# Patient Record
Sex: FEMALE | Race: OTHER | Employment: FULL TIME | URBAN - METROPOLITAN AREA
[De-identification: names, ages, dates, MRNs, and addresses within clinical notes are randomized per-mention and may not be internally consistent; named-entity substitution may affect disease eponyms.]

---

## 2018-01-29 ENCOUNTER — OFFICE VISIT (OUTPATIENT)
Dept: FAMILY MEDICINE CLINIC | Age: 23
End: 2018-01-29
Payer: COMMERCIAL

## 2018-01-29 VITALS
DIASTOLIC BLOOD PRESSURE: 84 MMHG | RESPIRATION RATE: 16 BRPM | SYSTOLIC BLOOD PRESSURE: 124 MMHG | BODY MASS INDEX: 46.17 KG/M2 | HEIGHT: 66 IN | WEIGHT: 287.31 LBS | TEMPERATURE: 97.2 F | HEART RATE: 88 BPM

## 2018-01-29 DIAGNOSIS — J02.0 STREP PHARYNGITIS: Primary | ICD-10-CM

## 2018-01-29 DIAGNOSIS — J02.9 SORE THROAT: ICD-10-CM

## 2018-01-29 DIAGNOSIS — J03.00 ACUTE NON-RECURRENT STREPTOCOCCAL TONSILLITIS: ICD-10-CM

## 2018-01-29 LAB — S PYO AG THROAT QL: POSITIVE

## 2018-01-29 PROCEDURE — 99213 OFFICE O/P EST LOW 20 MIN: CPT | Performed by: FAMILY MEDICINE

## 2018-01-29 PROCEDURE — G8417 CALC BMI ABV UP PARAM F/U: HCPCS | Performed by: FAMILY MEDICINE

## 2018-01-29 PROCEDURE — 1036F TOBACCO NON-USER: CPT | Performed by: FAMILY MEDICINE

## 2018-01-29 PROCEDURE — G8484 FLU IMMUNIZE NO ADMIN: HCPCS | Performed by: FAMILY MEDICINE

## 2018-01-29 PROCEDURE — 87880 STREP A ASSAY W/OPTIC: CPT | Performed by: FAMILY MEDICINE

## 2018-01-29 PROCEDURE — G8427 DOCREV CUR MEDS BY ELIG CLIN: HCPCS | Performed by: FAMILY MEDICINE

## 2018-01-29 RX ORDER — AMOXICILLIN AND CLAVULANATE POTASSIUM 875; 125 MG/1; MG/1
1 TABLET, FILM COATED ORAL 2 TIMES DAILY
Qty: 20 TABLET | Refills: 0 | Status: SHIPPED | OUTPATIENT
Start: 2018-01-29 | End: 2018-02-08

## 2018-01-29 ASSESSMENT — PATIENT HEALTH QUESTIONNAIRE - PHQ9
2. FEELING DOWN, DEPRESSED OR HOPELESS: 0
SUM OF ALL RESPONSES TO PHQ9 QUESTIONS 1 & 2: 0
1. LITTLE INTEREST OR PLEASURE IN DOING THINGS: 0
SUM OF ALL RESPONSES TO PHQ QUESTIONS 1-9: 0

## 2018-01-29 NOTE — PROGRESS NOTES
Subjective:      Patient ID: Conrad Sanchez is a 23 y.o. female. Chief Complaint   Patient presents with    Pharyngitis     x 3 days. Gradually worsening. Also has swollen glands. No other symptoms. Has tried cough drops and a hot toddy with no relief. 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 no other symptoms settled tiredness       rundown Tylenol helps somewhat      No Known Allergies  Outpatient Encounter Prescriptions as of 1/29/2018   Medication Sig Dispense Refill    amoxicillin-clavulanate (AUGMENTIN) 875-125 MG per tablet Take 1 tablet by mouth 2 times daily for 10 days 20 tablet 0    [DISCONTINUED] mometasone-formoterol (DULERA) 200-5 MCG/ACT inhaler Inhale 2 puffs into the lungs every 12 hours 1 Inhaler 3    [DISCONTINUED] albuterol sulfate HFA (PROAIR HFA) 108 (90 BASE) MCG/ACT inhaler Inhale 2 puffs into the lungs every 6 hours as needed for Wheezing 1 Inhaler 3     No facility-administered encounter medications on file as of 1/29/2018. Social History     Social History    Marital status: Single     Spouse name: N/A    Number of children: N/A    Years of education: N/A     Occupational History    Not on file. Social History Main Topics    Smoking status: Never Smoker    Smokeless tobacco: Never Used    Alcohol use Yes      Comment: occasionally    Drug use: Unknown    Sexual activity: Yes     Partners: Male     Other Topics Concern    Not on file     Social History Narrative    No narrative on file     Family History   Problem Relation Age of Onset    Crohn's Disease Brother     Asthma Brother     Other Mother      thyroid    Arthritis Maternal Grandmother      Past Medical History:   Diagnosis Date    Asthma      History reviewed. No pertinent surgical history.       REVIEW OF SYSTEMS:   REVIEW OF SYSTEMS:   Patient seen today for exam.  Denies any problems

## 2018-09-19 ENCOUNTER — OFFICE VISIT (OUTPATIENT)
Dept: FAMILY MEDICINE CLINIC | Age: 23
End: 2018-09-19
Payer: COMMERCIAL

## 2018-09-19 VITALS
DIASTOLIC BLOOD PRESSURE: 80 MMHG | OXYGEN SATURATION: 98 % | BODY MASS INDEX: 48.63 KG/M2 | TEMPERATURE: 97.9 F | HEIGHT: 65 IN | HEART RATE: 95 BPM | WEIGHT: 291.9 LBS | SYSTOLIC BLOOD PRESSURE: 130 MMHG | RESPIRATION RATE: 16 BRPM

## 2018-09-19 DIAGNOSIS — J34.89 SINUS PRESSURE: ICD-10-CM

## 2018-09-19 DIAGNOSIS — Z00.00 ANNUAL PHYSICAL EXAM: ICD-10-CM

## 2018-09-19 DIAGNOSIS — J01.11 ACUTE RECURRENT FRONTAL SINUSITIS: Primary | ICD-10-CM

## 2018-09-19 PROCEDURE — G8427 DOCREV CUR MEDS BY ELIG CLIN: HCPCS | Performed by: FAMILY MEDICINE

## 2018-09-19 PROCEDURE — 1036F TOBACCO NON-USER: CPT | Performed by: FAMILY MEDICINE

## 2018-09-19 PROCEDURE — G8417 CALC BMI ABV UP PARAM F/U: HCPCS | Performed by: FAMILY MEDICINE

## 2018-09-19 PROCEDURE — 99213 OFFICE O/P EST LOW 20 MIN: CPT | Performed by: FAMILY MEDICINE

## 2018-09-19 RX ORDER — CEFDINIR 300 MG/1
600 CAPSULE ORAL DAILY
Qty: 20 CAPSULE | Refills: 0 | Status: SHIPPED | OUTPATIENT
Start: 2018-09-19 | End: 2018-09-29

## 2018-09-19 NOTE — PROGRESS NOTES
(1.651 m)   Wt 291 lb 14.4 oz (132.4 kg)   LMP 09/13/2018 (Exact Date)   SpO2 98%   BMI 48.57 kg/m²     Physical Exam      PHYSICAL EXAMINATION:  Vital signs as noted. Alert, oriented in no acute distress. HEENT:  TMs are showing fluid bilaterally. Pharynx reveals postnasal drainage noted. Positive pain over sinuses and forehead  Pos shoddy adenopathy noted bilaterallyNECK: supple. HEART:  RRR without gallops. LUNGS:  clear to auscultation, no wheezing or rhonchi. ABDOMEN:  soft and nontender, bowel sounds positive. EXTREMITIES:  no edema. SKIN: without any changes      Assessment:       Diagnosis Orders   1. Acute recurrent frontal sinusitis     2. Annual physical exam  Comprehensive Metabolic Panel    Lipid Panel    CBC With Auto Differential   3. Sinus pressure               Plan:        Orders Placed This Encounter   Medications    cefdinir (OMNICEF) 300 MG capsule     Sig: Take 2 capsules by mouth daily for 10 days     Dispense:  20 capsule     Refill:  0     Orders Placed This Encounter   Procedures    Comprehensive Metabolic Panel     Standing Status:   Future     Standing Expiration Date:   9/19/2019    Lipid Panel     Standing Status:   Future     Standing Expiration Date:   9/19/2019     Order Specific Question:   Is Patient Fasting?/# of Hours     Answer:   yes    CBC With Auto Differential     Standing Status:   Future     Standing Expiration Date:   9/19/2019           Health Maintenance Due   Topic Date Due    HIV screen  04/02/2008    Pneumococcal med risk (1 of 1 - PPSV23) 04/02/2012    Flu vaccine (1) 09/01/2018             Controlled Substances Monitoring:     No flowsheet data found.         If anything worsens or changes please call us at once , follow-up in the office as planned,

## 2019-03-19 ENCOUNTER — TELEPHONE (OUTPATIENT)
Dept: FAMILY MEDICINE CLINIC | Age: 24
End: 2019-03-19

## 2023-11-08 PROBLEM — J45.909 ASTHMA (HHS-HCC): Status: ACTIVE | Noted: 2023-11-08

## 2023-11-08 RX ORDER — ASCORBIC ACID 125 MG
1 TABLET,CHEWABLE ORAL DAILY
COMMUNITY

## 2023-11-16 ENCOUNTER — OFFICE VISIT (OUTPATIENT)
Dept: PRIMARY CARE | Facility: CLINIC | Age: 28
End: 2023-11-16
Payer: COMMERCIAL

## 2023-11-16 VITALS
HEART RATE: 92 BPM | SYSTOLIC BLOOD PRESSURE: 133 MMHG | BODY MASS INDEX: 47.09 KG/M2 | HEIGHT: 66 IN | WEIGHT: 293 LBS | DIASTOLIC BLOOD PRESSURE: 84 MMHG | TEMPERATURE: 97.3 F

## 2023-11-16 DIAGNOSIS — Z12.4 PAP SMEAR FOR CERVICAL CANCER SCREENING: ICD-10-CM

## 2023-11-16 DIAGNOSIS — J45.20 MILD INTERMITTENT ASTHMA, UNSPECIFIED WHETHER COMPLICATED (HHS-HCC): ICD-10-CM

## 2023-11-16 DIAGNOSIS — E66.01 CLASS 3 SEVERE OBESITY DUE TO EXCESS CALORIES WITHOUT SERIOUS COMORBIDITY WITH BODY MASS INDEX (BMI) OF 45.0 TO 49.9 IN ADULT (MULTI): Primary | ICD-10-CM

## 2023-11-16 DIAGNOSIS — F32.1 CURRENT MODERATE EPISODE OF MAJOR DEPRESSIVE DISORDER, UNSPECIFIED WHETHER RECURRENT (MULTI): ICD-10-CM

## 2023-11-16 PROBLEM — M76.899 TENDONITIS OF KNEE: Status: ACTIVE | Noted: 2023-11-16

## 2023-11-16 PROCEDURE — 1036F TOBACCO NON-USER: CPT | Performed by: INTERNAL MEDICINE

## 2023-11-16 PROCEDURE — 99203 OFFICE O/P NEW LOW 30 MIN: CPT | Performed by: INTERNAL MEDICINE

## 2023-11-16 PROCEDURE — 3008F BODY MASS INDEX DOCD: CPT | Performed by: INTERNAL MEDICINE

## 2023-11-16 RX ORDER — BUPROPION HYDROCHLORIDE 150 MG/1
150 TABLET ORAL EVERY MORNING
Qty: 30 TABLET | Refills: 3 | Status: SHIPPED | OUTPATIENT
Start: 2023-11-16 | End: 2024-02-13 | Stop reason: SDUPTHER

## 2023-11-16 ASSESSMENT — ENCOUNTER SYMPTOMS
ARTHRALGIAS: 1
HOPELESSNESS: 1
FEELINGS OF WORTHLESSNESS: 1
CONFUSION: 0
NERVOUS/ANXIOUS: 0
CARDIOVASCULAR NEGATIVE: 1
EYES NEGATIVE: 1
NEUROLOGICAL NEGATIVE: 1
THOUGHTS THAT DEATH WOULD BE EASIER: 0
GASTROINTESTINAL NEGATIVE: 1
COMPULSIONS: 0
CHOKING: 0
ANHEDONIA: 1
DEPRESSION: 1
WEIGHT GAIN: 1
RESPIRATORY NEGATIVE: 1
DEPRESSED MOOD: 1
HYPERSOMNIA: 0
INSOMNIA: 1
SLEEP QUALITY: FAIR
HOURS OF SLEEP PER NIGHT: 7 HOURS
DECREASED CONCENTRATION: 0
SHORTNESS OF BREATH: 0
NIGHTTIME AWAKENINGS: ONE TO TWO
DYSPHORIC MOOD: 0

## 2023-11-16 NOTE — PROGRESS NOTES
"Subjective   Patient ID: Luli Canales is a 28 y.o. female who presents for \[facility] [place] [facility]\"" Care (Establishing care and depression).    Depression  Visit Type: initial  Onset of symptoms: more than 6 months ago  Progression since onset: waxing and waning  Patient presents with the following symptoms: anhedonia, depressed mood, feelings of hopelessness, feelings of worthlessness, insomnia and weight gain.  Patient is not experiencing: chest pain, choking sensation, compulsions, confusion, decreased concentration, dizziness, excessive worry, fatigue, hypersomnia, nausea, nervousness/anxiety, shortness of breath and thoughts of death.  Frequency of symptoms: most days   Severity: moderate   Sleep per night: 7 hours  Sleep quality: fair  Nighttime awakenings: one to two  No history of: anxiety/panic attacks and mental illness       Review of Systems   Constitutional:  Positive for weight gain.   HENT: Negative.     Eyes: Negative.    Respiratory: Negative.  Negative for choking and shortness of breath.    Cardiovascular: Negative.    Gastrointestinal: Negative.    Musculoskeletal:  Positive for arthralgias.   Neurological: Negative.    Psychiatric/Behavioral:  Positive for depression. Negative for confusion, decreased concentration and dysphoric mood. The patient has insomnia. The patient is not nervous/anxious.        Objective   /84 (BP Location: Left arm, Patient Position: Sitting, BP Cuff Size: Large adult)   Pulse 92   Temp 36.3 °C (97.3 °F) (Temporal)   Ht 1.676 m (5' 6\")   Wt 137 kg (303 lb)   BMI 48.91 kg/m²     Physical Exam  Constitutional:       Appearance: Normal appearance. She is obese.   HENT:      Head: Normocephalic.   Eyes:      Conjunctiva/sclera: Conjunctivae normal.   Cardiovascular:      Rate and Rhythm: Normal rate and regular rhythm.      Heart sounds: Normal heart sounds.   Pulmonary:      Effort: Pulmonary effort is normal.      Breath sounds: Normal breath sounds.   Abdominal:      " General: Abdomen is protuberant.      Palpations: Abdomen is soft.   Musculoskeletal:         General: Normal range of motion.      Cervical back: Normal range of motion and neck supple.   Skin:     General: Skin is warm and dry.   Neurological:      General: No focal deficit present.      Mental Status: She is oriented to person, place, and time. Mental status is at baseline.   Psychiatric:         Mood and Affect: Mood is depressed. Mood is not anxious. Affect is not blunt, angry or tearful.       Assessment/Plan   Problem List Items Addressed This Visit             ICD-10-CM    Asthma J45.909     Other Visit Diagnoses         Codes    Class 3 severe obesity due to excess calories without serious comorbidity with body mass index (BMI) of 45.0 to 49.9 in adult (CMS/[facility])    -  Primary E66.01, Z68.42    Current moderate episode of major depressive disorder, unspecified whether recurrent (CMS/HCC)     F32.1        Depression is chronic and quite common and notorious mental health disorder and it is quite common and widespread, there are several therapeutics available for depression now a days. It is considered as chemical imbalance disorder and with medications , it can be adjusted. There are side effects from SSRI and SNRIs but on long term, they are well tolerated. Please do not feel awkward or shy to contact us if feel tired, sleepy, lack of energy or aloof/ alone. Untreated depression can bring serious consequences including suicidal ideations, mental health counselling is available if need arises. Usually treatment of depression is long lasting therapy with periodic evaluations and follow ups. Pt with depression no longer have to feel frustrated, helpless or isolated.Detailed discussion was carried out.   Reviewed depression questionnaire and she scored 12, she has a moderate to severe depression, needed counseling to psychotherapy, needed therapy bupropion will be the appropriate treatment because of weight  neutral properties.  SSRI and SNRI has weight gain properties, we need to work with this patient consistently to get her out of this depression and enjoyment of the life, pleasure of the life, meeting people friend's family having some outdoor activities particularly when weather is better or even in the festive times are always important part for mental wellbeing exercise also is a important part of wellbeing, periodic assessment and follow-up will be done, several laboratories were ordered, GYN checkup is must, asthma is not a acute issue, obesity is a issue and that also could be the culprit for her depression not intensely although, she works full-time, she works from home, would discuss with her mother periodically who works in our office, lets make sure that therapeutic options are appropriate for her to come out of this depression with the help of counseling.

## 2023-11-27 ENCOUNTER — LAB (OUTPATIENT)
Dept: LAB | Facility: LAB | Age: 28
End: 2023-11-27
Payer: COMMERCIAL

## 2023-11-27 DIAGNOSIS — E66.01 CLASS 3 SEVERE OBESITY DUE TO EXCESS CALORIES WITHOUT SERIOUS COMORBIDITY WITH BODY MASS INDEX (BMI) OF 45.0 TO 49.9 IN ADULT (MULTI): ICD-10-CM

## 2023-11-27 DIAGNOSIS — F32.1 CURRENT MODERATE EPISODE OF MAJOR DEPRESSIVE DISORDER, UNSPECIFIED WHETHER RECURRENT (MULTI): ICD-10-CM

## 2023-11-27 LAB
25(OH)D3 SERPL-MCNC: 23 NG/ML (ref 30–100)
ALBUMIN SERPL BCP-MCNC: 4 G/DL (ref 3.4–5)
ALP SERPL-CCNC: 78 U/L (ref 33–110)
ALT SERPL W P-5'-P-CCNC: 11 U/L (ref 7–45)
ANION GAP SERPL CALC-SCNC: 15 MMOL/L (ref 10–20)
AST SERPL W P-5'-P-CCNC: 14 U/L (ref 9–39)
BASOPHILS # BLD AUTO: 0.03 X10*3/UL (ref 0–0.1)
BASOPHILS NFR BLD AUTO: 0.3 %
BILIRUB SERPL-MCNC: 0.3 MG/DL (ref 0–1.2)
BUN SERPL-MCNC: 11 MG/DL (ref 6–23)
CALCIUM SERPL-MCNC: 8.9 MG/DL (ref 8.6–10.3)
CHLORIDE SERPL-SCNC: 105 MMOL/L (ref 98–107)
CHOLEST SERPL-MCNC: 202 MG/DL (ref 0–199)
CHOLESTEROL/HDL RATIO: 3.7
CO2 SERPL-SCNC: 24 MMOL/L (ref 21–32)
CREAT SERPL-MCNC: 0.81 MG/DL (ref 0.5–1.05)
EOSINOPHIL # BLD AUTO: 0.12 X10*3/UL (ref 0–0.7)
EOSINOPHIL NFR BLD AUTO: 1.2 %
ERYTHROCYTE [DISTWIDTH] IN BLOOD BY AUTOMATED COUNT: 14.4 % (ref 11.5–14.5)
GFR SERPL CREATININE-BSD FRML MDRD: >90 ML/MIN/1.73M*2
GLUCOSE SERPL-MCNC: 83 MG/DL (ref 74–99)
HCT VFR BLD AUTO: 38.9 % (ref 36–46)
HDLC SERPL-MCNC: 54.5 MG/DL
HGB BLD-MCNC: 12 G/DL (ref 12–16)
IMM GRANULOCYTES # BLD AUTO: 0.02 X10*3/UL (ref 0–0.7)
IMM GRANULOCYTES NFR BLD AUTO: 0.2 % (ref 0–0.9)
LDLC SERPL CALC-MCNC: 125 MG/DL
LYMPHOCYTES # BLD AUTO: 3.22 X10*3/UL (ref 1.2–4.8)
LYMPHOCYTES NFR BLD AUTO: 33.3 %
MCH RBC QN AUTO: 25.6 PG (ref 26–34)
MCHC RBC AUTO-ENTMCNC: 30.8 G/DL (ref 32–36)
MCV RBC AUTO: 83 FL (ref 80–100)
MONOCYTES # BLD AUTO: 0.75 X10*3/UL (ref 0.1–1)
MONOCYTES NFR BLD AUTO: 7.8 %
NEUTROPHILS # BLD AUTO: 5.52 X10*3/UL (ref 1.2–7.7)
NEUTROPHILS NFR BLD AUTO: 57.2 %
NON HDL CHOLESTEROL: 148 MG/DL (ref 0–149)
NRBC BLD-RTO: 0 /100 WBCS (ref 0–0)
PLATELET # BLD AUTO: 270 X10*3/UL (ref 150–450)
POTASSIUM SERPL-SCNC: 4 MMOL/L (ref 3.5–5.3)
PROT SERPL-MCNC: 6.8 G/DL (ref 6.4–8.2)
RBC # BLD AUTO: 4.69 X10*6/UL (ref 4–5.2)
SODIUM SERPL-SCNC: 140 MMOL/L (ref 136–145)
TRIGL SERPL-MCNC: 113 MG/DL (ref 0–149)
TSH SERPL-ACNC: 2.69 MIU/L (ref 0.44–3.98)
VLDL: 23 MG/DL (ref 0–40)
WBC # BLD AUTO: 9.7 X10*3/UL (ref 4.4–11.3)

## 2023-11-27 PROCEDURE — 82306 VITAMIN D 25 HYDROXY: CPT

## 2023-11-27 PROCEDURE — 80061 LIPID PANEL: CPT

## 2023-11-27 PROCEDURE — 36415 COLL VENOUS BLD VENIPUNCTURE: CPT

## 2023-11-27 PROCEDURE — 84443 ASSAY THYROID STIM HORMONE: CPT

## 2023-11-27 PROCEDURE — 85025 COMPLETE CBC W/AUTO DIFF WBC: CPT

## 2023-11-27 PROCEDURE — 80053 COMPREHEN METABOLIC PANEL: CPT

## 2024-02-01 ENCOUNTER — APPOINTMENT (OUTPATIENT)
Dept: PRIMARY CARE | Facility: CLINIC | Age: 29
End: 2024-02-01
Payer: COMMERCIAL

## 2024-02-13 DIAGNOSIS — F32.1 CURRENT MODERATE EPISODE OF MAJOR DEPRESSIVE DISORDER, UNSPECIFIED WHETHER RECURRENT (MULTI): ICD-10-CM

## 2024-02-13 RX ORDER — BUPROPION HYDROCHLORIDE 150 MG/1
150 TABLET ORAL EVERY MORNING
Qty: 30 TABLET | Refills: 3 | Status: SHIPPED | OUTPATIENT
Start: 2024-02-13 | End: 2024-05-24 | Stop reason: SDUPTHER

## 2024-05-24 ENCOUNTER — APPOINTMENT (OUTPATIENT)
Dept: PRIMARY CARE | Facility: CLINIC | Age: 29
End: 2024-05-24
Payer: COMMERCIAL

## 2024-05-24 ENCOUNTER — OFFICE VISIT (OUTPATIENT)
Dept: PRIMARY CARE | Facility: CLINIC | Age: 29
End: 2024-05-24
Payer: COMMERCIAL

## 2024-05-24 VITALS
SYSTOLIC BLOOD PRESSURE: 130 MMHG | BODY MASS INDEX: 48.82 KG/M2 | DIASTOLIC BLOOD PRESSURE: 79 MMHG | HEART RATE: 75 BPM | HEIGHT: 65 IN | TEMPERATURE: 97.1 F | WEIGHT: 293 LBS

## 2024-05-24 DIAGNOSIS — Z01.419 WELL WOMAN EXAM: ICD-10-CM

## 2024-05-24 DIAGNOSIS — J45.20 MILD INTERMITTENT ASTHMA, UNSPECIFIED WHETHER COMPLICATED (HHS-HCC): ICD-10-CM

## 2024-05-24 DIAGNOSIS — F32.1 CURRENT MODERATE EPISODE OF MAJOR DEPRESSIVE DISORDER, UNSPECIFIED WHETHER RECURRENT (MULTI): ICD-10-CM

## 2024-05-24 DIAGNOSIS — E66.01 CLASS 3 SEVERE OBESITY DUE TO EXCESS CALORIES WITHOUT SERIOUS COMORBIDITY WITH BODY MASS INDEX (BMI) OF 50.0 TO 59.9 IN ADULT (MULTI): Primary | ICD-10-CM

## 2024-05-24 PROCEDURE — 99213 OFFICE O/P EST LOW 20 MIN: CPT | Performed by: INTERNAL MEDICINE

## 2024-05-24 PROCEDURE — 1036F TOBACCO NON-USER: CPT | Performed by: INTERNAL MEDICINE

## 2024-05-24 PROCEDURE — 3008F BODY MASS INDEX DOCD: CPT | Performed by: INTERNAL MEDICINE

## 2024-05-24 RX ORDER — BUPROPION HYDROCHLORIDE 150 MG/1
150 TABLET ORAL EVERY MORNING
Qty: 30 TABLET | Refills: 3 | Status: SHIPPED | OUTPATIENT
Start: 2024-05-24 | End: 2024-09-21

## 2024-05-24 ASSESSMENT — ENCOUNTER SYMPTOMS
ARTHRALGIAS: 0
DECREASED CONCENTRATION: 0
NEUROLOGICAL NEGATIVE: 1
CARDIOVASCULAR NEGATIVE: 1
GASTROINTESTINAL NEGATIVE: 1
INSOMNIA: 0
SLEEP QUALITY: FAIR
CONSTITUTIONAL NEGATIVE: 1
HEMOPTYSIS: 0
CHEST TIGHTNESS: 0
NERVOUS/ANXIOUS: 0
RESPIRATORY NEGATIVE: 1
DEPRESSION: 1
SLEEP DISTURBANCE: 0
SHORTNESS OF BREATH: 0
AGITATION: 0

## 2024-05-24 NOTE — PROGRESS NOTES
"Subjective   Patient ID: Luli Canales is a 29 y.o. female who presents for Follow-up (6 mo fu).    Depression  Visit Type: follow-up  Patient is not experiencing: chest pain, decreased concentration, fatigue, insomnia, nausea, nervousness/anxiety and shortness of breath.   Severity: mild   Sleep quality: fair  Patient has a history of: asthma    Asthma  There is no chest tightness, hemoptysis or shortness of breath. The current episode started more than 1 year ago. The problem occurs rarely. The problem has been resolved. Her symptoms are aggravated by nothing. Her past medical history is significant for asthma.        Review of Systems   Constitutional: Negative.         Wt gain   Respiratory: Negative.  Negative for hemoptysis and shortness of breath.    Cardiovascular: Negative.    Gastrointestinal: Negative.    Musculoskeletal:  Negative for arthralgias.   Skin: Negative.    Neurological: Negative.    Psychiatric/Behavioral:  Positive for depression. Negative for agitation, decreased concentration and sleep disturbance. The patient is not nervous/anxious and does not have insomnia.        Objective   /79 (BP Location: Right arm, Patient Position: Sitting, BP Cuff Size: Large adult)   Pulse 75   Temp 36.2 °C (97.1 °F) (Temporal)   Ht 1.651 m (5' 5\")   Wt 139 kg (306 lb)   BMI 50.92 kg/m²     Physical Exam  Vitals reviewed.   Constitutional:       Appearance: Normal appearance. She is obese.   HENT:      Head: Normocephalic and atraumatic.   Eyes:      Conjunctiva/sclera: Conjunctivae normal.   Cardiovascular:      Rate and Rhythm: Normal rate and regular rhythm.      Pulses: Normal pulses.   Pulmonary:      Effort: Pulmonary effort is normal.      Breath sounds: Normal breath sounds.   Abdominal:      General: Abdomen is protuberant.      Palpations: Abdomen is soft.   Musculoskeletal:         General: Normal range of motion.      Cervical back: Neck supple.   Skin:     General: Skin is warm and dry. "   Neurological:      General: No focal deficit present.   Psychiatric:         Mood and Affect: Mood normal.       Assessment/Plan   Problem List Items Addressed This Visit             ICD-10-CM    Asthma ([facility]) J45.909     Other Visit Diagnoses         Codes    Class 3 severe obesity due to excess calories without serious comorbidity with body mass index (BMI) of 50.0 to 59.9 in adult (Multi)    -  Primary E66.01, Z68.43    Current moderate episode of major depressive disorder, unspecified whether recurrent (Multi)     F32.1    Relevant Medications    buPROPion XL (Wellbutrin XL) 150 mg 24 hr tablet    Well woman exam     Z01.419    Relevant Orders    Referral to Gynecology        I spent <15 minutes face to face with individual providing recommendations for nutrition choices and exercise plan to help achieve weight reduction goals. Obesity is systemic disorder and it can bring devastating morbidities in furture. It is a matter of calorie gain and loss, keeping bodybank in negative calorie balance mode is the way to sustain weight loss.Diet has a big role in reducing excess body wt. Scheduled and well planned meals and food intake with watchfulness and understanding of calorie portion and distribution is key to understand. Bariatric surgery is another option if sustained wt loss is not achieved and faced with one or more comorbidities with morbid obesity. Weigh yourself twice a month to understand and follow wt loss goals. Need wt loss of  12 lbs by labor day. Need GYN check. Be motivated.  Concerned about obesity although BMI of more than 50, weight loss target was given, has not have any asthma does not have to use any inhalers it is a mild very intermittent asthma I would say, she will continue Wellbutrin and with Wellbutrin she has felt much better, in the future could consider something like phentermine if she has a difficulty losing weight but she will try and with her diet modification and lifestyle  modification there is always possibility of weight loss, various measures were discussed, profound visceral obesity and hip and [facility] obesity is there.  Last times laboratories were reviewed, follow-up in 3 months.

## 2024-06-12 DIAGNOSIS — F32.1 CURRENT MODERATE EPISODE OF MAJOR DEPRESSIVE DISORDER, UNSPECIFIED WHETHER RECURRENT (MULTI): ICD-10-CM

## 2024-06-12 RX ORDER — BUPROPION HYDROCHLORIDE 150 MG/1
150 TABLET ORAL EVERY MORNING
Qty: 30 TABLET | Refills: 3 | Status: SHIPPED | OUTPATIENT
Start: 2024-06-12 | End: 2024-06-13 | Stop reason: SDUPTHER

## 2024-06-13 DIAGNOSIS — F32.1 CURRENT MODERATE EPISODE OF MAJOR DEPRESSIVE DISORDER, UNSPECIFIED WHETHER RECURRENT (MULTI): ICD-10-CM

## 2024-06-13 RX ORDER — BUPROPION HYDROCHLORIDE 150 MG/1
150 TABLET ORAL EVERY MORNING
Qty: 30 TABLET | Refills: 3 | Status: SHIPPED | OUTPATIENT
Start: 2024-06-13 | End: 2024-10-11

## 2024-08-23 ENCOUNTER — APPOINTMENT (OUTPATIENT)
Dept: PRIMARY CARE | Facility: CLINIC | Age: 29
End: 2024-08-23
Payer: COMMERCIAL

## 2024-08-23 VITALS
SYSTOLIC BLOOD PRESSURE: 123 MMHG | WEIGHT: 293 LBS | HEART RATE: 80 BPM | BODY MASS INDEX: 48.82 KG/M2 | TEMPERATURE: 96 F | DIASTOLIC BLOOD PRESSURE: 82 MMHG | HEIGHT: 65 IN

## 2024-08-23 DIAGNOSIS — F32.9 REACTIVE DEPRESSION: ICD-10-CM

## 2024-08-23 DIAGNOSIS — E66.01 CLASS 3 SEVERE OBESITY DUE TO EXCESS CALORIES WITHOUT SERIOUS COMORBIDITY WITH BODY MASS INDEX (BMI) OF 50.0 TO 59.9 IN ADULT (MULTI): Primary | ICD-10-CM

## 2024-08-23 PROBLEM — E66.813 CLASS 3 SEVERE OBESITY DUE TO EXCESS CALORIES WITHOUT SERIOUS COMORBIDITY WITH BODY MASS INDEX (BMI) OF 50.0 TO 59.9 IN ADULT: Status: ACTIVE | Noted: 2024-08-23

## 2024-08-23 PROCEDURE — 1036F TOBACCO NON-USER: CPT | Performed by: INTERNAL MEDICINE

## 2024-08-23 PROCEDURE — 99213 OFFICE O/P EST LOW 20 MIN: CPT | Performed by: INTERNAL MEDICINE

## 2024-08-23 PROCEDURE — 3008F BODY MASS INDEX DOCD: CPT | Performed by: INTERNAL MEDICINE

## 2024-08-23 ASSESSMENT — ENCOUNTER SYMPTOMS
GASTROINTESTINAL NEGATIVE: 1
MUSCULOSKELETAL NEGATIVE: 1
RESPIRATORY NEGATIVE: 1
HEMATOLOGIC/LYMPHATIC NEGATIVE: 1
CARDIOVASCULAR NEGATIVE: 1
CONSTITUTIONAL NEGATIVE: 1
NEUROLOGICAL NEGATIVE: 1

## 2024-08-23 NOTE — PROGRESS NOTES
Subjective   Patient ID: Luli Canales is a 29 y.o. female who presents for Follow-up (3 mo fu).  HPI  Depression  Visit Type: follow-up  Patient is not experiencing: chest pain, decreased concentration, fatigue, insomnia, nausea, nervousness/anxiety and shortness of breath.   Severity: mild   Sleep quality: fair  Patient has a history of: asthma  Obesity  Luli Canales is a 29 y.o. female who presents for evaluation of obesity. She has noted a weight gain of approximately few pounds over the last several weeks. There is a positive family history for obesity in  family . She feels ideal weight is 200 pounds. Weight at graduation from high school was 202 pounds. History of eating disorders: none. Previous treatments for obesity include: none. Associated medical conditions:  none . Associated medications:  none . Cardiovascular risk factors besides obesity: obesity (BMI >= 30 kg/m2) and sedentary lifestyle.    The following portions of the chart were reviewed this encounter and updated as appropriate:  Tobacco  Allergies  Meds  Problems  Med Hx  Surg Hx  Fam Hx       Past Medical History  History reviewed. No pertinent past medical history.    Social History  Social History     Tobacco Use    Smoking status: Never    Smokeless tobacco: Never   Vaping Use    Vaping status: Never Used   Substance Use Topics    Alcohol use: Not Currently    Drug use: Never       Family History     Family History   Problem Relation Name Age of Onset    Hypothyroidism Mother Vane     Hypertension Mother Vane     Depression Mother Vane     No Known Problems Father      ADD / ADHD Brother D'Wain     Crohn's disease Brother D'Wain     Diabetes Mother's Brother D'Wain     COPD Mother's Brother D'Wain     Pneumonia Mother's Brother D'Wain     No Known Problems Father's Brother      Diabetes Maternal Grandmother Carmen     Heart failure Maternal Grandmother Carmen     COPD Maternal Grandmother Carmen     Heart attack Maternal Grandmother  "Carmen     Stroke Maternal Grandmother Carmne     Neuropathy Maternal Grandmother Carmen     Hyperthyroidism Maternal Grandmother Carmen     Hypertension Maternal Grandmother Carmen     Peripheral vascular disease Maternal Grandfather Ajay     COPD Maternal Grandfather Ajay     Lung cancer Maternal Grandfather Ajay     No Known Problems Paternal Grandmother      No Known Problems Paternal Grandfather         Allergies:  No Known Allergies     Outpatient Medications:  Current Outpatient Medications   Medication Instructions    biotin 5,000 mcg tablet,chewable 1 tablet, oral, Daily    buPROPion XL (WELLBUTRIN XL) 150 mg, oral, Every morning, Do not crush, chew, or split.    multivit-min/folic acid/uwd331 (ALIVE PREMIUM ADULT ORAL) 1 tablet, oral, Daily        Review of Systems   Constitutional: Negative.    Respiratory: Negative.     Cardiovascular: Negative.    Gastrointestinal: Negative.    Musculoskeletal: Negative.    Neurological: Negative.    Hematological: Negative.          Objective       Physical Exam  Vitals reviewed.   Constitutional:       Appearance: Normal appearance. She is obese.   HENT:      Head: Normocephalic.   Eyes:      Conjunctiva/sclera: Conjunctivae normal.   Cardiovascular:      Rate and Rhythm: Normal rate and regular rhythm.      Pulses: Normal pulses.   Pulmonary:      Effort: Pulmonary effort is normal.      Breath sounds: Normal breath sounds.   Abdominal:      General: Abdomen is protuberant.      Palpations: Abdomen is soft.   Musculoskeletal:         General: No tenderness or deformity.      Cervical back: Neck supple.   Skin:     General: Skin is warm and dry.   Neurological:      General: No focal deficit present.   Psychiatric:         Mood and Affect: Mood normal.     /82 (BP Location: Right arm, Patient Position: Sitting, BP Cuff Size: Large adult)   Pulse 80   Temp 35.6 °C (96 °F) (Temporal)   Ht 1.651 m (5' 5\")   Wt 139 kg (306 lb)   BMI 50.92 kg/m²  "     Assessment/Plan   Problem List Items Addressed This Visit       Class 3 severe obesity due to excess calories without serious comorbidity with body mass index (BMI) of 50.0 to 59.9 in adult (Multi) - Primary    Reactive depression   I spent <15 minutes face to face with individual providing recommendations for nutrition choices and exercise plan to help achieve weight reduction goals. Obesity is systemic disorder and it can bring devastating morbidities in furture. It is a matter of calorie gain and loss, keeping bodybank in negative calorie balance mode is the way to sustain weight loss.Diet has a big role in reducing excess body wt. Scheduled and well planned meals and food intake with watchfulness and understanding of calorie portion and distribution is key to understand. Bariatric surgery is another option if sustained wt loss is not achieved and faced with one or more comorbidities with morbid obesity. Weigh yourself twice a week to understand and follow wt loss goals.  Gave a target of weight loss that need to be at 280 pounds in 6 months, depression is controlled, talked about various modalities for weight loss there are some commercial diets, weight watchers, intermittent fasting, any modality which shows to be a looking to have a restricted calories, healthy choices of meals, she works from home, this much of obesity could bring a lot of comorbidities in the future, Wellbutrin dose will not be altered, follow-up in 6 months.  GYN checkup should be done.

## 2024-09-13 PROBLEM — Z01.419 NORMAL GYNECOLOGIC EXAMINATION: Status: ACTIVE | Noted: 2024-09-13

## 2024-09-16 ENCOUNTER — APPOINTMENT (OUTPATIENT)
Dept: OBSTETRICS AND GYNECOLOGY | Facility: CLINIC | Age: 29
End: 2024-09-16
Payer: COMMERCIAL

## 2024-09-16 VITALS
DIASTOLIC BLOOD PRESSURE: 88 MMHG | HEIGHT: 65 IN | BODY MASS INDEX: 48.82 KG/M2 | WEIGHT: 293 LBS | SYSTOLIC BLOOD PRESSURE: 146 MMHG

## 2024-09-16 DIAGNOSIS — Z12.4 SCREENING FOR CERVICAL CANCER: ICD-10-CM

## 2024-09-16 DIAGNOSIS — Z30.011 ORAL CONTRACEPTION INITIAL PRESCRIPTION: ICD-10-CM

## 2024-09-16 DIAGNOSIS — Z30.41 ORAL CONTRACEPTIVE USE: ICD-10-CM

## 2024-09-16 DIAGNOSIS — Z01.419 NORMAL GYNECOLOGIC EXAMINATION: Primary | ICD-10-CM

## 2024-09-16 LAB — PREGNANCY TEST URINE, POC: NEGATIVE

## 2024-09-16 PROCEDURE — 3008F BODY MASS INDEX DOCD: CPT | Performed by: OBSTETRICS & GYNECOLOGY

## 2024-09-16 PROCEDURE — 81025 URINE PREGNANCY TEST: CPT | Performed by: OBSTETRICS & GYNECOLOGY

## 2024-09-16 PROCEDURE — 1036F TOBACCO NON-USER: CPT | Performed by: OBSTETRICS & GYNECOLOGY

## 2024-09-16 PROCEDURE — 87624 HPV HI-RISK TYP POOLED RSLT: CPT

## 2024-09-16 PROCEDURE — 99385 PREV VISIT NEW AGE 18-39: CPT | Performed by: OBSTETRICS & GYNECOLOGY

## 2024-09-16 RX ORDER — VIT C/E/ZN/COPPR/LUTEIN/ZEAXAN 250MG-90MG
25 CAPSULE ORAL DAILY
COMMUNITY

## 2024-09-16 NOTE — PROGRESS NOTES
"GYNECOLOGY PROGRESS NOTE        CC:    Chief Complaint   Patient presents with    Annual Exam     New patient - annual exam with pap test and discuss BC - interested in the pill  Last pap - 12 yrs ago  Chaperone kristie albert ma       HPI:  Luli Canales is here for a routine GYN examination.  No GYN c/o, no AUB.  Current BC=condoms, wants to restart OCPs, no pregnancy plans.      Depression screen:  negative  Fall screen:  negative  Domestic violence screen:  negative      ROS:  GI - no blood in BMs  URO - no hematuria  GYN - no AUB or vaginal discharge  PSYCH - on Rx--stable        PHYSICAL EXAM:  /88 (BP Location: Left arm, Patient Position: Sitting)   Ht 1.651 m (5' 5\")   Wt 139 kg (306 lb 8 oz)   LMP 08/28/2024   BMI 51.00 kg/m²   GEN:  A&O, NAD  ABD:  NT/ND, soft, no palpable masses, morbidly obese  URO:  normal urethra, no bladder TTP  GYN:  normal vulva and perineum w/o lesions or ulcers, normal vagina without discharge or lesions, normal cervix without lesions or discharge or CMT, bimanual exam--extremely limited due to body habitus but no gross masses or TTP   BREAST:  pendulous, no masses or TTP, no skin lesions or nipple discharge  PSYCH:  normal affect, non-anxious        IMPRESSION/PLAN:  Problem List Items Addressed This Visit       Normal gynecologic examination - Primary     Other Visit Diagnoses       Screening for cervical cancer        Relevant Orders    THINPREP PAP TEST    Oral contraception initial prescription        Relevant Orders    POCT pregnancy, urine manually resulted    Oral contraceptive use        Relevant Medications    norethindrone-e.estradioL-iron (Lo Loestrin) 1 mg-10 mcg (24)/10 mcg (2) tablet               Pap/HPV done today, no Hx of HGSIL, next pap/HPV due in 3 years (2027) due to lack of recent adequate cervical CA screening (last > 10 years).  ASCCP pap smear screening guidelines reviewed with the patient.   Gardasil:  yes    Birth control counseling:  Counseling " done on all birth control options, use/AE of hormonal birth control reviewed.  Recommend LARCs or Depo-Provera as first-line birth control, discouraged OCPs or barrier-only methods of birth control.  Will proceed with OCPs for contraception for now per patient request, use/AE reviewed.  Long-term birth control plan of a LARC encouraged.      OCP Rx:  Rx for Lo Loestrin FE provided--best to use lowest estrogen dose OCP given BMI > 50, use/AE reviewed, no absolute contraindications to use until age 35, VTE precautions reviewed.  Condom use PRN for STD prevention and backup birth control.            Ryan Garcia, DO          Ryan Garcia, DO

## 2024-09-30 LAB
CYTOLOGY CMNT CVX/VAG CYTO-IMP: NORMAL
HPV HR 12 DNA GENITAL QL NAA+PROBE: POSITIVE
HPV HR GENOTYPES PNL CVX NAA+PROBE: POSITIVE
HPV16 DNA SPEC QL NAA+PROBE: NEGATIVE
HPV18 DNA SPEC QL NAA+PROBE: NEGATIVE
LAB AP HPV GENOTYPE QUESTION: YES
LAB AP HPV HR: NORMAL
LABORATORY COMMENT REPORT: NORMAL
LMP START DATE: NORMAL
PATH REPORT.TOTAL CANCER: NORMAL

## 2024-10-02 ENCOUNTER — TELEPHONE (OUTPATIENT)
Dept: OBSTETRICS AND GYNECOLOGY | Facility: CLINIC | Age: 29
End: 2024-10-02
Payer: COMMERCIAL

## 2024-10-02 NOTE — TELEPHONE ENCOUNTER
Spoke with patient and made her aware of the results and recommendation.  Reviewed and approved by DANITZA SANTOS on 10/2/24 at 9:45 AM.

## 2024-10-02 NOTE — TELEPHONE ENCOUNTER
----- Message from Ryan Garcia sent at 9/30/2024  5:46 PM EDT -----  Call patient and let her know that her pap results were normal pap with positive other high-risk HPV. Guidelines call to just repeat the Pap and HPV testing in one year, if the HPV is still positive in a year then she will need a colposcopy.

## 2024-10-08 DIAGNOSIS — F32.1 CURRENT MODERATE EPISODE OF MAJOR DEPRESSIVE DISORDER, UNSPECIFIED WHETHER RECURRENT (MULTI): ICD-10-CM

## 2024-10-08 RX ORDER — BUPROPION HYDROCHLORIDE 150 MG/1
150 TABLET ORAL EVERY MORNING
Qty: 30 TABLET | Refills: 3 | Status: SHIPPED | OUTPATIENT
Start: 2024-10-08 | End: 2025-02-05

## 2025-02-03 DIAGNOSIS — F32.1 CURRENT MODERATE EPISODE OF MAJOR DEPRESSIVE DISORDER, UNSPECIFIED WHETHER RECURRENT (MULTI): ICD-10-CM

## 2025-02-03 RX ORDER — BUPROPION HYDROCHLORIDE 150 MG/1
150 TABLET ORAL EVERY MORNING
Qty: 30 TABLET | Refills: 3 | Status: SHIPPED | OUTPATIENT
Start: 2025-02-03 | End: 2025-06-03

## 2025-02-21 ENCOUNTER — APPOINTMENT (OUTPATIENT)
Dept: PRIMARY CARE | Facility: CLINIC | Age: 30
End: 2025-02-21
Payer: COMMERCIAL

## 2025-02-21 VITALS
HEART RATE: 92 BPM | DIASTOLIC BLOOD PRESSURE: 83 MMHG | TEMPERATURE: 97.7 F | HEIGHT: 65 IN | WEIGHT: 293 LBS | SYSTOLIC BLOOD PRESSURE: 140 MMHG | BODY MASS INDEX: 48.82 KG/M2

## 2025-02-21 DIAGNOSIS — E66.01 CLASS 3 SEVERE OBESITY DUE TO EXCESS CALORIES WITHOUT SERIOUS COMORBIDITY WITH BODY MASS INDEX (BMI) OF 45.0 TO 49.9 IN ADULT: ICD-10-CM

## 2025-02-21 DIAGNOSIS — B34.9 VIRAL INFECTION: Primary | ICD-10-CM

## 2025-02-21 DIAGNOSIS — F32.9 REACTIVE DEPRESSION: ICD-10-CM

## 2025-02-21 DIAGNOSIS — R05.1 ACUTE COUGH: ICD-10-CM

## 2025-02-21 DIAGNOSIS — J45.20 MILD INTERMITTENT ASTHMA, UNSPECIFIED WHETHER COMPLICATED (HHS-HCC): ICD-10-CM

## 2025-02-21 DIAGNOSIS — B34.9 VIRAL INFECTION: ICD-10-CM

## 2025-02-21 DIAGNOSIS — E66.813 CLASS 3 SEVERE OBESITY DUE TO EXCESS CALORIES WITHOUT SERIOUS COMORBIDITY WITH BODY MASS INDEX (BMI) OF 45.0 TO 49.9 IN ADULT: ICD-10-CM

## 2025-02-21 LAB
ALBUMIN SERPL-MCNC: 3.9 G/DL (ref 3.6–5.1)
ALP SERPL-CCNC: 67 U/L (ref 31–125)
ALT SERPL-CCNC: 14 U/L (ref 6–29)
ANION GAP SERPL CALCULATED.4IONS-SCNC: 11 MMOL/L (CALC) (ref 7–17)
AST SERPL-CCNC: 16 U/L (ref 10–30)
BASOPHILS # BLD AUTO: 20 CELLS/UL (ref 0–200)
BASOPHILS NFR BLD AUTO: 0.3 %
BILIRUB SERPL-MCNC: 0.2 MG/DL (ref 0.2–1.2)
BUN SERPL-MCNC: 11 MG/DL (ref 7–25)
CALCIUM SERPL-MCNC: 9.1 MG/DL (ref 8.6–10.2)
CHLORIDE SERPL-SCNC: 105 MMOL/L (ref 98–110)
CO2 SERPL-SCNC: 25 MMOL/L (ref 20–32)
CREAT SERPL-MCNC: 0.83 MG/DL (ref 0.5–0.97)
EGFRCR SERPLBLD CKD-EPI 2021: 97 ML/MIN/1.73M2
EOSINOPHIL # BLD AUTO: 92 CELLS/UL (ref 15–500)
EOSINOPHIL NFR BLD AUTO: 1.4 %
ERYTHROCYTE [DISTWIDTH] IN BLOOD BY AUTOMATED COUNT: 13.9 % (ref 11–15)
GLUCOSE SERPL-MCNC: 89 MG/DL (ref 65–99)
HCT VFR BLD AUTO: 39.7 % (ref 35–45)
HGB BLD-MCNC: 12.6 G/DL (ref 11.7–15.5)
LYMPHOCYTES # BLD AUTO: 2402 CELLS/UL (ref 850–3900)
LYMPHOCYTES NFR BLD AUTO: 36.4 %
MCH RBC QN AUTO: 24.7 PG (ref 27–33)
MCHC RBC AUTO-ENTMCNC: 31.7 G/DL (ref 32–36)
MCV RBC AUTO: 77.8 FL (ref 80–100)
MONOCYTES # BLD AUTO: 561 CELLS/UL (ref 200–950)
MONOCYTES NFR BLD AUTO: 8.5 %
NEUTROPHILS # BLD AUTO: 3524 CELLS/UL (ref 1500–7800)
NEUTROPHILS NFR BLD AUTO: 53.4 %
PLATELET # BLD AUTO: 281 THOUSAND/UL (ref 140–400)
PMV BLD REES-ECKER: 11.6 FL (ref 7.5–12.5)
POTASSIUM SERPL-SCNC: 4.6 MMOL/L (ref 3.5–5.3)
PROT SERPL-MCNC: 7 G/DL (ref 6.1–8.1)
RBC # BLD AUTO: 5.1 MILLION/UL (ref 3.8–5.1)
SODIUM SERPL-SCNC: 141 MMOL/L (ref 135–146)
WBC # BLD AUTO: 6.6 THOUSAND/UL (ref 3.8–10.8)

## 2025-02-21 PROCEDURE — 99213 OFFICE O/P EST LOW 20 MIN: CPT | Performed by: INTERNAL MEDICINE

## 2025-02-21 PROCEDURE — 1036F TOBACCO NON-USER: CPT | Performed by: INTERNAL MEDICINE

## 2025-02-21 PROCEDURE — 3008F BODY MASS INDEX DOCD: CPT | Performed by: INTERNAL MEDICINE

## 2025-02-21 ASSESSMENT — COLUMBIA-SUICIDE SEVERITY RATING SCALE - C-SSRS
6. HAVE YOU EVER DONE ANYTHING, STARTED TO DO ANYTHING, OR PREPARED TO DO ANYTHING TO END YOUR LIFE?: NO
2. HAVE YOU ACTUALLY HAD ANY THOUGHTS OF KILLING YOURSELF?: NO
1. IN THE PAST MONTH, HAVE YOU WISHED YOU WERE DEAD OR WISHED YOU COULD GO TO SLEEP AND NOT WAKE UP?: NO

## 2025-02-21 ASSESSMENT — ENCOUNTER SYMPTOMS
HEADACHES: 0
FEVER: 0
SHORTNESS OF BREATH: 0
SWEATS: 0
GASTROINTESTINAL NEGATIVE: 1
COUGH: 1
OCCASIONAL FEELINGS OF UNSTEADINESS: 0
DEPRESSION: 0
WEAKNESS: 0
SORE THROAT: 1
LOSS OF SENSATION IN FEET: 0
CARDIOVASCULAR NEGATIVE: 1

## 2025-02-21 ASSESSMENT — PATIENT HEALTH QUESTIONNAIRE - PHQ9
2. FEELING DOWN, DEPRESSED OR HOPELESS: NOT AT ALL
SUM OF ALL RESPONSES TO PHQ9 QUESTIONS 1 AND 2: 0
1. LITTLE INTEREST OR PLEASURE IN DOING THINGS: NOT AT ALL

## 2025-02-21 NOTE — PROGRESS NOTES
Subjective   Patient ID: Luli Canales is a 29 y.o. female who presents for Follow-up (6 mo fu and cough, congestion and headaches x 5 days).  Cough  This is a new problem. The current episode started in the past 7 days. The problem has been gradually improving. The problem occurs every few minutes. The cough is Non-productive. Associated symptoms include nasal congestion, postnasal drip and a sore throat. Pertinent negatives include no chest pain, ear congestion, ear pain, fever, headaches, shortness of breath or sweats. She has tried OTC cough suppressant for the symptoms. The treatment provided mild relief. Her past medical history is significant for asthma.   Depression  Visit Type: follow-up  Patient is not experiencing: chest pain, decreased concentration, fatigue, insomnia, nausea, nervousness/anxiety and shortness of breath.   Severity: much better   Sleep quality: fair  Patient has a history of: asthma  Obesity  Luli Canales is a 29 y.o. female who presents for evaluation of obesity. She has noted a weight gain of approximately few pounds over the last several weeks. There is a positive family history for obesity in family. She feels ideal weight is 200 pounds. Weight at graduation from high school was 202 pounds. History of eating disorders: none. Previous treatments for obesity include: none. Associated medical conditions: none. Associated medications: none. Cardiovascular risk factors besides obesity: obesity (BMI >= 30 kg/m2) and sedentary lifestyle. Lost 7 lbs    Past Medical History  Past Medical History:   Diagnosis Date    Asthma     Depression        Social History  Social History     Tobacco Use    Smoking status: Never    Smokeless tobacco: Never   Vaping Use    Vaping status: Never Used   Substance Use Topics    Alcohol use: Not Currently    Drug use: Never       Family History     Family History   Problem Relation Name Age of Onset    Hypothyroidism Mother Vane     Hypertension Mother Vane      Depression Mother Vane     No Known Problems Father      ADD / ADHD Brother D'Wain     Crohn's disease Brother D'Wain     Diabetes Mother's Brother D'Wain     COPD Mother's Brother D'Wain     Pneumonia Mother's Brother D'Wain     No Known Problems Father's Brother      Diabetes Maternal Grandmother Carmen     Heart failure Maternal Grandmother Carmen     COPD Maternal Grandmother Carmen     Heart attack Maternal Grandmother Carmen     Stroke Maternal Grandmother Carmen     Neuropathy Maternal Grandmother Carmen     Hyperthyroidism Maternal Grandmother Carmen     Hypertension Maternal Grandmother Carmen     Peripheral vascular disease Maternal Grandfather Ajay     COPD Maternal Grandfather Ajay     Lung cancer Maternal Grandfather Ajay     No Known Problems Paternal Grandmother      No Known Problems Paternal Grandfather         Allergies:  No Known Allergies     Outpatient Medications:  Current Outpatient Medications   Medication Instructions    biotin 5,000 mcg tablet,chewable 1 tablet, oral, Daily    buPROPion XL (WELLBUTRIN XL) 150 mg, oral, Every morning, Do not crush, chew, or split.    cholecalciferol (VITAMIN D3) 25 mcg, oral, Daily    multivit-min/folic acid/qcc011 (ALIVE PREMIUM ADULT ORAL) 1 tablet, oral, Daily    norethindrone-e.estradioL-iron (Lo Loestrin) 1 mg-10 mcg (24)/10 mcg (2) tablet 1 tablet, oral, Daily        Review of Systems   Constitutional:  Negative for fever.   HENT:  Positive for congestion, postnasal drip and sore throat. Negative for ear pain.    Respiratory:  Positive for cough. Negative for shortness of breath.    Cardiovascular: Negative.  Negative for chest pain.   Gastrointestinal: Negative.    Skin: Negative.    Neurological:  Negative for weakness and headaches.         Objective       Physical Exam  Vitals reviewed.   Constitutional:       Appearance: Normal appearance. She is obese.   HENT:      Head: Normocephalic and atraumatic.      Mouth/Throat:      Pharynx: Posterior  "oropharyngeal erythema present.   Eyes:      Conjunctiva/sclera: Conjunctivae normal.   Cardiovascular:      Rate and Rhythm: Normal rate and regular rhythm.      Pulses: Normal pulses.   Pulmonary:      Effort: Pulmonary effort is normal.      Breath sounds: Normal breath sounds.   Abdominal:      General: Abdomen is protuberant.      Palpations: Abdomen is soft.   Musculoskeletal:         General: Normal range of motion.      Cervical back: Normal range of motion.   Skin:     General: Skin is warm and dry.   Neurological:      General: No focal deficit present.   Psychiatric:         Mood and Affect: Mood normal.       /83 (BP Location: Right arm, Patient Position: Sitting, BP Cuff Size: Large adult)   Pulse 92   Temp 36.5 °C (97.7 °F) (Temporal)   Ht 1.645 m (5' 4.75\")   Wt 135 kg (297 lb)   BMI 49.81 kg/m²      Assessment/Plan   Problem List Items Addressed This Visit       Asthma    Class 3 severe obesity due to excess calories without serious comorbidity with body mass index (BMI) of 45.0 to 49.9 in adult    Reactive depression     Other Visit Diagnoses       Viral infection    -  Primary    Relevant Orders    [facility] MISCELLANEOUS TEST (REFRIGERATED)    Acute cough        Relevant Orders    [facility] MISCELLANEOUS TEST (REFRIGERATED)        Came for regular follow-up but has been suffering from cold, cough, congestion, stuffiness since this Monday, 2 days for today, usually she is homebound, usually she does not go out, she works from home, there is a profound viral outbreak right now, appropriate nasopharyngeal smears were taken, just for diagnostic purpose smears were taken, she lost about 7 to 8 pounds, next weight loss target will be 213 pounds or less.  Lets hope that that could happen in a progressive fashion, recent GYN checkup was reviewed, she feels very well, depression is much under control, she remains on Wellbutrin tolerating current dose very well, no side effects.  Obesity has been " reviewed and discussed, dietary modification is must, follow-up in 6 months.  Any concerns or issues will be reported sooner.

## 2025-02-22 LAB — QUEST FLEXITEST2 RESULTS:: NORMAL

## 2025-06-03 DIAGNOSIS — F32.1 CURRENT MODERATE EPISODE OF MAJOR DEPRESSIVE DISORDER, UNSPECIFIED WHETHER RECURRENT (MULTI): ICD-10-CM

## 2025-06-03 RX ORDER — BUPROPION HYDROCHLORIDE 150 MG/1
150 TABLET ORAL EVERY MORNING
Qty: 30 TABLET | Refills: 3 | Status: SHIPPED | OUTPATIENT
Start: 2025-06-03 | End: 2025-10-01

## 2025-07-17 ENCOUNTER — OFFICE VISIT (OUTPATIENT)
Dept: PRIMARY CARE | Facility: CLINIC | Age: 30
End: 2025-07-17
Payer: COMMERCIAL

## 2025-07-17 VITALS
SYSTOLIC BLOOD PRESSURE: 123 MMHG | HEART RATE: 95 BPM | TEMPERATURE: 96.6 F | BODY MASS INDEX: 48.82 KG/M2 | HEIGHT: 65 IN | DIASTOLIC BLOOD PRESSURE: 77 MMHG | WEIGHT: 293 LBS

## 2025-07-17 DIAGNOSIS — H60.391 OTHER INFECTIVE ACUTE OTITIS EXTERNA OF RIGHT EAR: Primary | ICD-10-CM

## 2025-07-17 DIAGNOSIS — H65.191 OTHER NON-RECURRENT ACUTE NONSUPPURATIVE OTITIS MEDIA OF RIGHT EAR: ICD-10-CM

## 2025-07-17 PROCEDURE — 99213 OFFICE O/P EST LOW 20 MIN: CPT | Performed by: INTERNAL MEDICINE

## 2025-07-17 PROCEDURE — 3008F BODY MASS INDEX DOCD: CPT | Performed by: INTERNAL MEDICINE

## 2025-07-17 PROCEDURE — 1036F TOBACCO NON-USER: CPT | Performed by: INTERNAL MEDICINE

## 2025-07-17 RX ORDER — NEOMYCIN SULFATE, POLYMYXIN B SULFATE, HYDROCORTISONE 3.5; 10000; 1 MG/ML; [USP'U]/ML; MG/ML
2 SOLUTION/ DROPS AURICULAR (OTIC) 3 TIMES DAILY
Qty: 10 ML | Refills: 0 | Status: SHIPPED | OUTPATIENT
Start: 2025-07-17 | End: 2025-07-24

## 2025-07-17 RX ORDER — AMOXICILLIN 500 MG/1
500 CAPSULE ORAL EVERY 8 HOURS SCHEDULED
Qty: 24 CAPSULE | Refills: 0 | Status: SHIPPED | OUTPATIENT
Start: 2025-07-17 | End: 2025-07-25

## 2025-07-17 ASSESSMENT — COLUMBIA-SUICIDE SEVERITY RATING SCALE - C-SSRS
2. HAVE YOU ACTUALLY HAD ANY THOUGHTS OF KILLING YOURSELF?: NO
6. HAVE YOU EVER DONE ANYTHING, STARTED TO DO ANYTHING, OR PREPARED TO DO ANYTHING TO END YOUR LIFE?: NO
1. IN THE PAST MONTH, HAVE YOU WISHED YOU WERE DEAD OR WISHED YOU COULD GO TO SLEEP AND NOT WAKE UP?: NO

## 2025-07-17 ASSESSMENT — ENCOUNTER SYMPTOMS
VOMITING: 0
LOSS OF SENSATION IN FEET: 0
CARDIOVASCULAR NEGATIVE: 1
RESPIRATORY NEGATIVE: 1
HEADACHES: 0
OCCASIONAL FEELINGS OF UNSTEADINESS: 0
FATIGUE: 0
ARTHRALGIAS: 0
NEUROLOGICAL NEGATIVE: 1
COUGH: 0
DEPRESSION: 0
GASTROINTESTINAL NEGATIVE: 1

## 2025-07-17 ASSESSMENT — PATIENT HEALTH QUESTIONNAIRE - PHQ9
2. FEELING DOWN, DEPRESSED OR HOPELESS: NOT AT ALL
1. LITTLE INTEREST OR PLEASURE IN DOING THINGS: NOT AT ALL
SUM OF ALL RESPONSES TO PHQ9 QUESTIONS 1 AND 2: 0

## 2025-07-17 NOTE — PROGRESS NOTES
Subjective   Patient ID: Luli Canales is a 30 y.o. female who presents for Earache (Earache x 2 days, no other sx).  Earache   There is pain in the right ear. This is a new problem. The current episode started in the past 7 days. The problem occurs constantly. The problem has been unchanged. There has been no fever. The pain is at a severity of 5/10. The pain is moderate. Associated symptoms include ear discharge. Pertinent negatives include no coughing, headaches, hearing loss or vomiting. The treatment provided no relief. There is no history of a chronic ear infection.       Past Medical History  Medical History[1]    Social History  Social History[2]    Family History   Family History[3]    Allergies:  RX Allergies[4]     Outpatient Medications:  Current Outpatient Medications   Medication Instructions    biotin 5,000 mcg tablet,chewable 1 tablet, Daily    buPROPion XL (WELLBUTRIN XL) 150 mg, oral, Every morning, Do not crush, chew, or split.    cholecalciferol (VITAMIN D3) 25 mcg, Daily    multivit-min/folic acid/snu942 (ALIVE PREMIUM ADULT ORAL) 1 tablet, Daily    norethindrone-e.estradioL-iron (Lo Loestrin) 1 mg-10 mcg (24)/10 mcg (2) tablet 1 tablet, oral, Daily        Review of Systems   Constitutional:  Negative for fatigue.   HENT:  Positive for ear discharge and ear pain. Negative for hearing loss.    Respiratory: Negative.  Negative for cough.    Cardiovascular: Negative.    Gastrointestinal: Negative.  Negative for vomiting.   Musculoskeletal:  Negative for arthralgias.   Neurological: Negative.  Negative for headaches.         Objective       Physical Exam  Vitals reviewed.   Constitutional:       Appearance: Normal appearance. She is obese.   HENT:      Head: Normocephalic.      Right Ear: Swelling and tenderness present. Tympanic membrane is injected.     Eyes:      Conjunctiva/sclera: Conjunctivae normal.       Cardiovascular:      Rate and Rhythm: Normal rate and regular rhythm.   Pulmonary:       "Effort: Pulmonary effort is normal.      Breath sounds: Normal breath sounds.   Abdominal:      General: Abdomen is protuberant.      Palpations: Abdomen is soft.     Musculoskeletal:         General: Normal range of motion.      Cervical back: Neck supple.     Skin:     General: Skin is warm and dry.       /77 (BP Location: Right arm, Patient Position: Sitting, BP Cuff Size: Adult long)   Pulse 95   Temp 35.9 °C (96.6 °F) (Temporal)   Ht 1.645 m (5' 4.75\")   Wt 136 kg (299 lb)   BMI 50.14 kg/m²      Assessment/Plan   Problem List Items Addressed This Visit    None  Visit Diagnoses         Other infective acute otitis externa of right ear    -  Primary      Other non-recurrent acute nonsuppurative otitis media of right ear            There is external otitis, there is erythema and swelling of external ear, slight injection of the tympanic membrane also, tragus has been tender, movement of the earlobe is tender, she swam last Saturday, Cortisporin otic drops and amoxicillin were given, avoid any water exposure to the ears, there is no purulence or any intense swelling of external canal but external canal is erythematous and slightly swollen and tender.         [1]   Past Medical History:  Diagnosis Date    Asthma     Depression    [2]   Social History  Tobacco Use    Smoking status: Never    Smokeless tobacco: Never   Vaping Use    Vaping status: Never Used   Substance Use Topics    Alcohol use: Not Currently    Drug use: Never   [3]   Family History  Problem Relation Name Age of Onset    Hypothyroidism Mother Vane     Hypertension Mother Vane     Depression Mother Vane     No Known Problems Father      ADD / ADHD Brother D'Wain     Crohn's disease Brother D'Wain     Diabetes Mother's Brother D'Wain     COPD Mother's Brother D'Wain     Pneumonia Mother's Brother D'Wain     No Known Problems Father's Brother      Diabetes Maternal Grandmother Carmen     Heart failure Maternal Grandmother Carmen     COPD " Maternal Grandmother Carmen     Heart attack Maternal Grandmother Carmen     Stroke Maternal Grandmother Carmen     Neuropathy Maternal Grandmother Carmen     Hyperthyroidism Maternal Grandmother Carmen     Hypertension Maternal Grandmother Carmen     Peripheral vascular disease Maternal Grandfather Ajay     COPD Maternal Grandfather Ajay     Lung cancer Maternal Grandfather Ajay     No Known Problems Paternal Grandmother      No Known Problems Paternal Grandfather     [4] No Known Allergies

## 2025-08-22 ENCOUNTER — APPOINTMENT (OUTPATIENT)
Dept: PRIMARY CARE | Facility: CLINIC | Age: 30
End: 2025-08-22
Payer: COMMERCIAL

## 2025-08-22 VITALS
BODY MASS INDEX: 48.82 KG/M2 | HEART RATE: 78 BPM | TEMPERATURE: 97.5 F | HEIGHT: 65 IN | WEIGHT: 293 LBS | DIASTOLIC BLOOD PRESSURE: 80 MMHG | SYSTOLIC BLOOD PRESSURE: 120 MMHG

## 2025-08-22 DIAGNOSIS — F32.1 CURRENT MODERATE EPISODE OF MAJOR DEPRESSIVE DISORDER, UNSPECIFIED WHETHER RECURRENT (MULTI): ICD-10-CM

## 2025-08-22 DIAGNOSIS — E66.813 CLASS 3 SEVERE OBESITY DUE TO EXCESS CALORIES WITHOUT SERIOUS COMORBIDITY WITH BODY MASS INDEX (BMI) OF 45.0 TO 49.9 IN ADULT: Primary | ICD-10-CM

## 2025-08-22 PROCEDURE — 3008F BODY MASS INDEX DOCD: CPT | Performed by: INTERNAL MEDICINE

## 2025-08-22 PROCEDURE — 1036F TOBACCO NON-USER: CPT | Performed by: INTERNAL MEDICINE

## 2025-08-22 PROCEDURE — 99213 OFFICE O/P EST LOW 20 MIN: CPT | Performed by: INTERNAL MEDICINE

## 2025-08-22 RX ORDER — BUPROPION HYDROCHLORIDE 150 MG/1
150 TABLET ORAL EVERY MORNING
Qty: 30 TABLET | Refills: 3 | Status: SHIPPED | OUTPATIENT
Start: 2025-08-22 | End: 2025-12-20

## 2025-08-22 ASSESSMENT — ENCOUNTER SYMPTOMS
MUSCLE TENSION: 0
NEUROLOGICAL NEGATIVE: 1
SLEEP QUALITY: GOOD
DEPRESSION: 0
ANHEDONIA: 0
GASTROINTESTINAL NEGATIVE: 1
DEPRESSION: 1
ARTHRALGIAS: 0
CARDIOVASCULAR NEGATIVE: 1
RESPIRATORY NEGATIVE: 1
CONSTITUTIONAL NEGATIVE: 1
INSOMNIA: 0
LOSS OF SENSATION IN FEET: 0
OCCASIONAL FEELINGS OF UNSTEADINESS: 0
WEIGHT LOSS: 0

## 2025-08-22 ASSESSMENT — PATIENT HEALTH QUESTIONNAIRE - PHQ9
1. LITTLE INTEREST OR PLEASURE IN DOING THINGS: NOT AT ALL
2. FEELING DOWN, DEPRESSED OR HOPELESS: NOT AT ALL
SUM OF ALL RESPONSES TO PHQ9 QUESTIONS 1 AND 2: 0

## 2025-08-28 DIAGNOSIS — T63.441A BEE STING REACTION, ACCIDENTAL OR UNINTENTIONAL, INITIAL ENCOUNTER: ICD-10-CM

## 2025-08-28 RX ORDER — PREDNISONE 20 MG/1
20 TABLET ORAL DAILY
Qty: 5 TABLET | Refills: 1 | Status: SHIPPED | OUTPATIENT
Start: 2025-08-28 | End: 2025-09-07

## 2025-09-22 ENCOUNTER — APPOINTMENT (OUTPATIENT)
Dept: OBSTETRICS AND GYNECOLOGY | Facility: CLINIC | Age: 30
End: 2025-09-22
Payer: COMMERCIAL

## 2026-02-20 ENCOUNTER — APPOINTMENT (OUTPATIENT)
Dept: PRIMARY CARE | Facility: CLINIC | Age: 31
End: 2026-02-20
Payer: COMMERCIAL